# Patient Record
Sex: FEMALE | Race: WHITE | NOT HISPANIC OR LATINO | URBAN - METROPOLITAN AREA
[De-identification: names, ages, dates, MRNs, and addresses within clinical notes are randomized per-mention and may not be internally consistent; named-entity substitution may affect disease eponyms.]

---

## 2017-01-31 ENCOUNTER — OUTPATIENT (OUTPATIENT)
Dept: OUTPATIENT SERVICES | Facility: HOSPITAL | Age: 52
LOS: 1 days | End: 2017-01-31
Payer: COMMERCIAL

## 2017-01-31 ENCOUNTER — APPOINTMENT (OUTPATIENT)
Dept: MRI IMAGING | Facility: CLINIC | Age: 52
End: 2017-01-31

## 2017-01-31 DIAGNOSIS — Z00.8 ENCOUNTER FOR OTHER GENERAL EXAMINATION: ICD-10-CM

## 2017-01-31 PROCEDURE — 73721 MRI JNT OF LWR EXTRE W/O DYE: CPT

## 2017-05-04 ENCOUNTER — OUTPATIENT (OUTPATIENT)
Dept: OUTPATIENT SERVICES | Facility: HOSPITAL | Age: 52
LOS: 1 days | End: 2017-05-04
Payer: COMMERCIAL

## 2017-05-04 ENCOUNTER — APPOINTMENT (OUTPATIENT)
Dept: MAMMOGRAPHY | Facility: CLINIC | Age: 52
End: 2017-05-04

## 2017-05-04 DIAGNOSIS — Z00.00 ENCOUNTER FOR GENERAL ADULT MEDICAL EXAMINATION WITHOUT ABNORMAL FINDINGS: ICD-10-CM

## 2017-05-04 PROCEDURE — 77080 DXA BONE DENSITY AXIAL: CPT

## 2017-05-04 PROCEDURE — 77067 SCR MAMMO BI INCL CAD: CPT

## 2017-05-04 PROCEDURE — 77063 BREAST TOMOSYNTHESIS BI: CPT

## 2017-05-10 DIAGNOSIS — Z78.0 ASYMPTOMATIC MENOPAUSAL STATE: ICD-10-CM

## 2017-05-10 DIAGNOSIS — M85.852 OTHER SPECIFIED DISORDERS OF BONE DENSITY AND STRUCTURE, LEFT THIGH: ICD-10-CM

## 2017-05-10 DIAGNOSIS — Z12.31 ENCOUNTER FOR SCREENING MAMMOGRAM FOR MALIGNANT NEOPLASM OF BREAST: ICD-10-CM

## 2018-07-12 ENCOUNTER — OUTPATIENT (OUTPATIENT)
Dept: OUTPATIENT SERVICES | Facility: HOSPITAL | Age: 53
LOS: 1 days | End: 2018-07-12
Payer: COMMERCIAL

## 2018-07-12 ENCOUNTER — APPOINTMENT (OUTPATIENT)
Dept: MAMMOGRAPHY | Facility: CLINIC | Age: 53
End: 2018-07-12
Payer: COMMERCIAL

## 2018-07-12 DIAGNOSIS — Z12.31 ENCOUNTER FOR SCREENING MAMMOGRAM FOR MALIGNANT NEOPLASM OF BREAST: ICD-10-CM

## 2018-07-12 PROCEDURE — 77067 SCR MAMMO BI INCL CAD: CPT

## 2018-07-12 PROCEDURE — 77067 SCR MAMMO BI INCL CAD: CPT | Mod: 26

## 2018-07-12 PROCEDURE — 77063 BREAST TOMOSYNTHESIS BI: CPT | Mod: 26

## 2018-07-12 PROCEDURE — 77063 BREAST TOMOSYNTHESIS BI: CPT

## 2018-08-11 ENCOUNTER — TRANSCRIPTION ENCOUNTER (OUTPATIENT)
Age: 53
End: 2018-08-11

## 2018-10-22 ENCOUNTER — APPOINTMENT (OUTPATIENT)
Dept: ENDOCRINOLOGY | Facility: CLINIC | Age: 53
End: 2018-10-22
Payer: COMMERCIAL

## 2018-10-22 VITALS — BODY MASS INDEX: 25.61 KG/M2 | WEIGHT: 150 LBS | HEIGHT: 64 IN

## 2018-10-22 VITALS — HEART RATE: 75 BPM | SYSTOLIC BLOOD PRESSURE: 100 MMHG | DIASTOLIC BLOOD PRESSURE: 60 MMHG

## 2018-10-22 DIAGNOSIS — Z78.9 OTHER SPECIFIED HEALTH STATUS: ICD-10-CM

## 2018-10-22 LAB — HBA1C MFR BLD HPLC: 7.4

## 2018-10-22 PROCEDURE — 99244 OFF/OP CNSLTJ NEW/EST MOD 40: CPT

## 2018-10-22 RX ORDER — ACETAMINOPHEN/DIPHENHYDRAMINE 500MG-25MG
1000 TABLET ORAL
Refills: 0 | Status: ACTIVE | COMMUNITY

## 2018-11-01 ENCOUNTER — RESULT REVIEW (OUTPATIENT)
Age: 53
End: 2018-11-01

## 2018-11-07 ENCOUNTER — APPOINTMENT (OUTPATIENT)
Dept: ENDOCRINOLOGY | Facility: CLINIC | Age: 53
End: 2018-11-07

## 2018-11-26 ENCOUNTER — RX RENEWAL (OUTPATIENT)
Age: 53
End: 2018-11-26

## 2018-12-10 ENCOUNTER — APPOINTMENT (OUTPATIENT)
Dept: DERMATOLOGY | Facility: CLINIC | Age: 53
End: 2018-12-10

## 2019-02-14 ENCOUNTER — RX CHANGE (OUTPATIENT)
Age: 54
End: 2019-02-14

## 2019-02-14 ENCOUNTER — APPOINTMENT (OUTPATIENT)
Dept: ENDOCRINOLOGY | Facility: CLINIC | Age: 54
End: 2019-02-14

## 2019-02-14 RX ORDER — LEVOTHYROXINE SODIUM 137 UG/1
137 TABLET ORAL
Refills: 0 | Status: DISCONTINUED | COMMUNITY
End: 2019-02-14

## 2019-04-10 ENCOUNTER — RX RENEWAL (OUTPATIENT)
Age: 54
End: 2019-04-10

## 2019-04-10 ENCOUNTER — MEDICATION RENEWAL (OUTPATIENT)
Age: 54
End: 2019-04-10

## 2019-04-11 ENCOUNTER — MEDICATION RENEWAL (OUTPATIENT)
Age: 54
End: 2019-04-11

## 2019-04-12 ENCOUNTER — MEDICATION RENEWAL (OUTPATIENT)
Age: 54
End: 2019-04-12

## 2019-05-08 ENCOUNTER — RESULT CHARGE (OUTPATIENT)
Age: 54
End: 2019-05-08

## 2019-05-08 ENCOUNTER — APPOINTMENT (OUTPATIENT)
Dept: ENDOCRINOLOGY | Facility: CLINIC | Age: 54
End: 2019-05-08
Payer: COMMERCIAL

## 2019-05-08 VITALS
SYSTOLIC BLOOD PRESSURE: 110 MMHG | BODY MASS INDEX: 26.29 KG/M2 | WEIGHT: 154 LBS | HEIGHT: 64 IN | HEART RATE: 78 BPM | DIASTOLIC BLOOD PRESSURE: 70 MMHG

## 2019-05-08 LAB — GLUCOSE BLDC GLUCOMTR-MCNC: 91

## 2019-05-08 PROCEDURE — 82962 GLUCOSE BLOOD TEST: CPT

## 2019-05-08 PROCEDURE — 99214 OFFICE O/P EST MOD 30 MIN: CPT | Mod: 25

## 2019-05-08 RX ORDER — ERGOCALCIFEROL 1.25 MG/1
1.25 MG CAPSULE ORAL
Refills: 0 | Status: DISCONTINUED | COMMUNITY
End: 2019-05-08

## 2019-05-08 NOTE — HISTORY OF PRESENT ILLNESS
[FreeTextEntry1] : Patient presents today for routine follow-up of diabetes, hypothyroidism, vitamin D deficiency, and hyperlipidemia. Now with adhesive capsulitis of the right shoulder. Getting PT. Had cortisone injections in February.\par \par Type: 1\par Severity: moderate\par Duration: since age 27\par Associated symptoms: none\par Modifying factors: worse due to diet\par \par Current meds for glycemic control:\par Medtronic with Humalog\par SMBG 6x daily, before and after each meal. Reviewed pump download. She is having fasting hyperglycemia and some post prandial hyperglycemia at dinner. She often enters "fake carbs" into the pump to get an insulin bolus. Seems to be inaccurately estimating carbohydrate intake (excessive). One episode of hypoglycemia after taking two boluses back to back for 100 gramd of carbs when she was not eating that much.\par Current B, ate a meal bar 4.5 hours ago\par \par Last eye exam: one year ago, no DR per patient. Denies vision changes.\par Last foot exam: over one year. Denies pain/numbness/tingling in B/L feet\par Diet: reports overeating lately, "eating the bad things ever since the holidays." Very stressed at work, finds it difficult to make a good meal, usually just trying to "get something in."\par Weight: 15 pounds in the past 5-6 months\par Exercise: none\par \par Also with Hashimoto's hypothyroid\par Current regimen: LT4 75 mcg, 1.5 tabs daily.\par Adherent with dosing and administration.\par \par Pump settings\par Basal\par 12a      0.775\par 7a        0.775\par 11a       0.775\par 4p         0.800\par 4:30p    0.775\par 10:30p  0.775\par \par ICR\par 12a      13\par \par ISF\par 12a     50\par \par Target\par 12a     120-140\par 6a       \par \par Active insulin time 4.0 hours

## 2019-05-08 NOTE — REASON FOR VISIT
[FreeTextEntry1] : Dr Coronel  [Initial Evaluation] : an initial evaluation [Follow-Up: _____] : a [unfilled] follow-up visit

## 2019-05-08 NOTE — PHYSICAL EXAM
[No Acute Distress] : no acute distress [Well Nourished] : well nourished [Alert] : alert [Normal Sclera/Conjunctiva] : normal sclera/conjunctiva [Well Developed] : well developed [EOMI] : extra ocular movement intact [Thyroid Not Enlarged] : the thyroid was not enlarged [No Proptosis] : no proptosis [Normal Oropharynx] : the oropharynx was normal [No Thyroid Nodules] : there were no palpable thyroid nodules [No Accessory Muscle Use] : no accessory muscle use [No Respiratory Distress] : no respiratory distress [Normal Rate] : heart rate was normal  [Clear to Auscultation] : lungs were clear to auscultation bilaterally [Normal S1, S2] : normal S1 and S2 [Regular Rhythm] : with a regular rhythm [Pedal Pulses Normal] : the pedal pulses are present [No Edema] : there was no peripheral edema [No Spinal Tenderness] : no spinal tenderness [Oriented x3] : oriented to person, place, and time [No Tremors] : no tremors [Right Foot Was Examined] : right foot ~C was examined [Left Foot Was Examined] : left foot ~C was examined [Normal] : normal [2+] : 2+ in the dorsalis pedis [Normal Affect] : the affect was normal [Normal Mood] : the mood was normal [Acanthosis Nigricans] : no acanthosis nigricans [#1 Diminished] : number 1 was normal [#3 Diminished] : number 3 was normal [#2 Diminished] : number 2 was normal [#4 Diminished] : number 4 was normal [#6 Diminished] : number 6 was normal [#5 Diminished] : number 5 was normal [#7 Diminished] : number 7 was normal [#8 Diminished] : number 8 was normal [#9 Diminished] : number 9 was normal [#10 Diminished] : number 10 was normal

## 2019-06-05 ENCOUNTER — APPOINTMENT (OUTPATIENT)
Dept: ENDOCRINOLOGY | Facility: CLINIC | Age: 54
End: 2019-06-05

## 2019-06-11 NOTE — CONSULT LETTER
Chief complaint: Nausea, vomiting and diarrhea  HPI:  6/10/19,   Time: 10:03 PM         Glendy Sheldon is a 10 y.o. male presenting to the ED for nausea, vomiting and diarrhea. The patient began with symptoms approximately 2 days ago. Nothing seems to make better worse. Symptoms have been constant since onset. Patient does have multiple nausea with multiple episodes of emesis. There is no hematemesis or coffee-ground emesis. The patient is also had multiple episodes of loose stool per day. There is no melena or hematochezia. The patient does have a sick contact in his sister at home. The mother has attempted to use ibuprofen and Pepto-Bismol at home with no relief. There is no suspicious food intake. No recent travel. Patient denies any particular abdominal pain but states that he does have a cramping sensation which is rated 9 out of 10 prior to having a bowel movement. Patient denies any fevers, chest pain, dysuria or hematuria. It is noted in triage complaint the patient does have asthma and is short of breath. The mother reports that the patient simply use his inhaler prior to coming to the emergency department. No shortness of breath at the current time. ROS:   Pertinent positives and negatives are stated within HPI, all other systems reviewed and are negative.  --------------------------------------------- PAST HISTORY ---------------------------------------------  Past Medical History:  has a past medical history of Asthma. Past Surgical History:  has no past surgical history on file. Social History:  reports that he has never smoked. He has never used smokeless tobacco.    Family History: family history is not on file. The patients home medications have been reviewed. Allergies: Patient has no known allergies.     -------------------------------------------------- RESULTS -------------------------------------------------  All laboratory and radiology results have been personally reviewed by myself   LABS:  No results found for this visit on 06/10/19. RADIOLOGY:  Interpreted by Radiologist.  No orders to display       ------------------------- NURSING NOTES AND VITALS REVIEWED ---------------------------   The nursing notes within the ED encounter and vital signs as below have been reviewed. Pulse 100   Temp 97.9 °F (36.6 °C)   Resp 20   Wt 84 lb (38.1 kg)   SpO2 99%   Oxygen Saturation Interpretation: Normal      ---------------------------------------------------PHYSICAL EXAM--------------------------------------      Constitutional/General: Alert and oriented x3, well appearing, non toxic in NAD  Head: NC/AT   Ears: Tympanic membranes unremarkable bilaterally  Eyes: PERRL, EOMI  Mouth: Oropharynx clear, handling secretions, no trismus  Neck: Supple, full ROM, no meningeal signs  Pulmonary: Lungs clear to auscultation bilaterally, no wheezes, rales, or rhonchi. Not in respiratory distress  Cardiovascular:  Regular rate and rhythm, no murmurs, gallops, or rubs. 2+ distal pulses  Abdomen: Soft, non tender, non distended,   Extremities: Moves all extremities x 4. Warm and well perfused  Skin: warm and dry without rash  Neurologic: GCS 15,  Psych: Normal Affect      ------------------------------ ED COURSE/MEDICAL DECISION MAKING----------------------  Medications   ondansetron (ZOFRAN-ODT) disintegrating tablet 4 mg (4 mg Oral Given 6/10/19 2028)         Medical Decision Making:    The patient is a 10year-old male presenting to the emergency department with a chief complaint of vomiting, diarrhea. Patient with benign abdominal examination. Patient was sick contact in sister. Zofran and p.o. challenge. Patient will be discharged home follow-up outpatient. Counseling: The emergency provider has spoken with the patient and discussed todays results, in addition to providing specific details for the plan of care and counseling regarding the diagnosis and prognosis. Questions are answered at this time and they are agreeable with the plan.      --------------------------------- IMPRESSION AND DISPOSITION ---------------------------------    IMPRESSION  1. Non-intractable vomiting with nausea, unspecified vomiting type    2.  Diarrhea, unspecified type        DISPOSITION  Disposition: Discharge to home  Patient condition is stable                  Ginette Andrade DO  06/11/19 0100 [Dear  ___] : Dear  [unfilled], [Consult Letter:] : I had the pleasure of evaluating your patient, [unfilled]. [Please see my note below.] : Please see my note below. [Consult Closing:] : Thank you very much for allowing me to participate in the care of this patient.  If you have any questions, please do not hesitate to contact me.

## 2019-06-21 ENCOUNTER — RX RENEWAL (OUTPATIENT)
Age: 54
End: 2019-06-21

## 2019-06-21 RX ORDER — BLOOD SUGAR DIAGNOSTIC
STRIP MISCELLANEOUS
Qty: 600 | Refills: 1 | Status: DISCONTINUED | COMMUNITY
Start: 2019-04-12 | End: 2019-06-21

## 2019-07-23 ENCOUNTER — APPOINTMENT (OUTPATIENT)
Dept: OBGYN | Facility: CLINIC | Age: 54
End: 2019-07-23

## 2019-08-06 ENCOUNTER — APPOINTMENT (OUTPATIENT)
Dept: ENDOCRINOLOGY | Facility: CLINIC | Age: 54
End: 2019-08-06

## 2019-08-27 ENCOUNTER — RX RENEWAL (OUTPATIENT)
Age: 54
End: 2019-08-27

## 2019-09-16 ENCOUNTER — MEDICATION RENEWAL (OUTPATIENT)
Age: 54
End: 2019-09-16

## 2019-09-19 ENCOUNTER — APPOINTMENT (OUTPATIENT)
Dept: OBGYN | Facility: CLINIC | Age: 54
End: 2019-09-19
Payer: COMMERCIAL

## 2019-09-19 ENCOUNTER — RECORD ABSTRACTING (OUTPATIENT)
Age: 54
End: 2019-09-19

## 2019-09-19 VITALS
HEIGHT: 64 IN | BODY MASS INDEX: 26.46 KG/M2 | WEIGHT: 155 LBS | SYSTOLIC BLOOD PRESSURE: 110 MMHG | DIASTOLIC BLOOD PRESSURE: 68 MMHG

## 2019-09-19 DIAGNOSIS — Z01.419 ENCOUNTER FOR GYNECOLOGICAL EXAMINATION (GENERAL) (ROUTINE) W/OUT ABNORMAL FINDINGS: ICD-10-CM

## 2019-09-19 DIAGNOSIS — Z78.0 ASYMPTOMATIC MENOPAUSAL STATE: ICD-10-CM

## 2019-09-19 DIAGNOSIS — Z82.79 FAMILY HISTORY OF OTHER CONGENITAL MALFORMATIONS, DEFORMATIONS AND CHROMOSOMAL ABNORMALITIES: ICD-10-CM

## 2019-09-19 DIAGNOSIS — Z11.51 ENCOUNTER FOR SCREENING FOR HUMAN PAPILLOMAVIRUS (HPV): ICD-10-CM

## 2019-09-19 DIAGNOSIS — Z82.69 FAMILY HISTORY OF OTHER DISEASES OF THE MUSCULOSKELETAL SYSTEM AND CONNECTIVE TISSUE: ICD-10-CM

## 2019-09-19 DIAGNOSIS — Z20.2 CONTACT WITH AND (SUSPECTED) EXPOSURE TO INFECTIONS WITH A PREDOMINANTLY SEXUAL MODE OF TRANSMISSION: ICD-10-CM

## 2019-09-19 DIAGNOSIS — Z86.39 PERSONAL HISTORY OF OTHER ENDOCRINE, NUTRITIONAL AND METABOLIC DISEASE: ICD-10-CM

## 2019-09-19 DIAGNOSIS — Z12.4 ENCOUNTER FOR SCREENING FOR MALIGNANT NEOPLASM OF CERVIX: ICD-10-CM

## 2019-09-19 LAB
CYTOLOGY CVX/VAG DOC THIN PREP: NEGATIVE
CYTOLOGY CVX/VAG DOC THIN PREP: NEGATIVE

## 2019-09-19 PROCEDURE — 99396 PREV VISIT EST AGE 40-64: CPT

## 2019-09-19 NOTE — PHYSICAL EXAM
[Awake] : awake [Alert] : alert [Acute Distress] : no acute distress [Mass] : no breast mass [Nipple Discharge] : no nipple discharge [Axillary LAD] : no axillary lymphadenopathy [Soft] : soft [Tender] : non tender [Oriented x3] : oriented to person, place, and time [Normal] : uterus [No Bleeding] : there was no active vaginal bleeding [Uterine Adnexae] : were not tender and not enlarged [No Tenderness] : no rectal tenderness [Nl Sphincter Tone] : normal sphincter tone

## 2019-09-20 LAB — HPV HIGH+LOW RISK DNA PNL CVX: NOT DETECTED

## 2019-09-25 LAB — CYTOLOGY CVX/VAG DOC THIN PREP: ABNORMAL

## 2019-09-26 ENCOUNTER — APPOINTMENT (OUTPATIENT)
Dept: ANTEPARTUM | Facility: CLINIC | Age: 54
End: 2019-09-26
Payer: COMMERCIAL

## 2019-09-26 PROCEDURE — 76830 TRANSVAGINAL US NON-OB: CPT

## 2019-09-26 PROCEDURE — 76856 US EXAM PELVIC COMPLETE: CPT | Mod: 59

## 2019-09-28 ENCOUNTER — APPOINTMENT (OUTPATIENT)
Dept: MAMMOGRAPHY | Facility: CLINIC | Age: 54
End: 2019-09-28

## 2019-09-28 ENCOUNTER — APPOINTMENT (OUTPATIENT)
Dept: RADIOLOGY | Facility: CLINIC | Age: 54
End: 2019-09-28

## 2019-11-08 ENCOUNTER — RESULT REVIEW (OUTPATIENT)
Age: 54
End: 2019-11-08

## 2019-11-08 DIAGNOSIS — N64.89 OTHER SPECIFIED DISORDERS OF BREAST: ICD-10-CM

## 2019-11-19 ENCOUNTER — APPOINTMENT (OUTPATIENT)
Dept: ENDOCRINOLOGY | Facility: CLINIC | Age: 54
End: 2019-11-19
Payer: COMMERCIAL

## 2019-11-19 PROCEDURE — 99214 OFFICE O/P EST MOD 30 MIN: CPT

## 2019-11-19 NOTE — HISTORY OF PRESENT ILLNESS
[FreeTextEntry1] : Type 1 Dm since 28 yo. treated with insulin humalog in medtronic pump. \par Hashimoto/ hypoT and takes Lt4\par

## 2019-11-19 NOTE — ASSESSMENT
[Carbohydrate Consistent Diet] : carbohydrate consistent diet [Long Term Vascular Complications] : long term vascular complications of diabetes [Importance of Diet and Exercise] : importance of diet and exercise to improve glycemic control, achieve weight loss and improve cardiovascular health [Self Monitoring of Blood Glucose] : self monitoring of blood glucose [Insulin Self-Administration] : insulin self-administration [Levothyroxine] : The patient was instructed to take Levothyroxine on an empty stomach, separate from vitamins, and wait at least 30 minutes before eating [FreeTextEntry1] : Type 1 dm treated with insulin pump now mildly uncontrolled with a1c now 7.8 \par all lab results reviewed and discussed with patient. All questions answered\par pump report reviewed and discussed with patient. No hypoG events. She has slightly erratic hyperG after meals mostly which probably reflects mismatch insulin to carb.\par continue with current setting basal rate\par lowered ins to carb ratio to 1:13 bc she has consistently high Bg after  meals. \par she does count carbs but not accurately. \par CDE for carb counting review \par goldy/ cratio normal. \par eye exam Sept 2019: reportedly no DR \par discussed diet and exercise\par encouraged more exercise walking 30 min 3 x week\par Discussed complications of diabetes at length including MI/CVA/ESRD/dialysis/blindness/amputations/infections\par Importance of blood glucose monitoring discussed. Targets reviewed. Recommended pt try to keep blood glucose level below 130 (110) before meals and below 160 (140) two hours after meals.\par Bgs 5 x day \par \par bp: at target .No meds. \par \par HLD: LDl at target. Continue with statin.\par \par hypoT: TSh over suppressed. decrease Lt4 to 75 mcg qd and and take 1.5 pill Sat-Sundays  \par \par Hashimoto: Hashimoto's: discussed autoimmune etiology and potential effect on thyroid function. \par requires monitoring Tfts at regular intervals.\par \par low B12: she takes supplements\par \par vitamin d defic: start vitamin d 1000 u qd

## 2019-11-19 NOTE — PHYSICAL EXAM
[Alert] : alert [No Acute Distress] : no acute distress [Well Nourished] : well nourished [Well Developed] : well developed [Normal Sclera/Conjunctiva] : normal sclera/conjunctiva [EOMI] : extra ocular movement intact [No Proptosis] : no proptosis [Normal Oropharynx] : the oropharynx was normal [Thyroid Not Enlarged] : the thyroid was not enlarged [No Thyroid Nodules] : there were no palpable thyroid nodules [No Respiratory Distress] : no respiratory distress [Normal Rate] : heart rate was normal  [No Accessory Muscle Use] : no accessory muscle use [Clear to Auscultation] : lungs were clear to auscultation bilaterally [Normal S1, S2] : normal S1 and S2 [Pedal Pulses Normal] : the pedal pulses are present [No Edema] : there was no peripheral edema [Regular Rhythm] : with a regular rhythm [Normal Bowel Sounds] : normal bowel sounds [Not Tender] : non-tender [Not Distended] : not distended [Soft] : abdomen soft [Anterior Cervical Nodes] : anterior cervical nodes [Post Cervical Nodes] : posterior cervical nodes [Normal] : normal and non tender [Normal Gait] : normal gait [No Stigmata of Cushings Syndrome] : no stigmata of cushings syndrome [No Rash] : no rash [Normal Reflexes] : deep tendon reflexes were 2+ and symmetric [Oriented x3] : oriented to person, place, and time [Acanthosis Nigricans] : no acanthosis nigricans

## 2019-12-20 DIAGNOSIS — Z86.19 PERSONAL HISTORY OF OTHER INFECTIOUS AND PARASITIC DISEASES: ICD-10-CM

## 2020-01-27 ENCOUNTER — RX RENEWAL (OUTPATIENT)
Age: 55
End: 2020-01-27

## 2020-02-26 ENCOUNTER — APPOINTMENT (OUTPATIENT)
Dept: ENDOCRINOLOGY | Facility: CLINIC | Age: 55
End: 2020-02-26
Payer: COMMERCIAL

## 2020-02-26 VITALS
BODY MASS INDEX: 28.51 KG/M2 | WEIGHT: 167 LBS | DIASTOLIC BLOOD PRESSURE: 60 MMHG | HEIGHT: 64 IN | SYSTOLIC BLOOD PRESSURE: 100 MMHG

## 2020-02-26 PROCEDURE — 99214 OFFICE O/P EST MOD 30 MIN: CPT

## 2020-02-26 RX ORDER — GLUCAGON 1 MG
1 KIT INJECTION
Qty: 1 | Refills: 1 | Status: ACTIVE | COMMUNITY
Start: 2020-02-26 | End: 1900-01-01

## 2020-02-26 NOTE — HISTORY OF PRESENT ILLNESS
[FreeTextEntry1] : followup for diabetes \par Type 1 Dm since 28 yo. treated with insulin humalog in medtronic pump. \par Hashimoto/ hypoT and takes Lt4\par

## 2020-02-26 NOTE — PHYSICAL EXAM
[Alert] : alert [Well Nourished] : well nourished [No Acute Distress] : no acute distress [Well Developed] : well developed [Normal Sclera/Conjunctiva] : normal sclera/conjunctiva [EOMI] : extra ocular movement intact [No Proptosis] : no proptosis [Normal Oropharynx] : the oropharynx was normal [Thyroid Not Enlarged] : the thyroid was not enlarged [No Accessory Muscle Use] : no accessory muscle use [No Thyroid Nodules] : there were no palpable thyroid nodules [No Respiratory Distress] : no respiratory distress [Clear to Auscultation] : lungs were clear to auscultation bilaterally [Normal Rate] : heart rate was normal  [Regular Rhythm] : with a regular rhythm [Normal S1, S2] : normal S1 and S2 [Pedal Pulses Normal] : the pedal pulses are present [Not Tender] : non-tender [No Edema] : there was no peripheral edema [Soft] : abdomen soft [Normal Bowel Sounds] : normal bowel sounds [Post Cervical Nodes] : posterior cervical nodes [Anterior Cervical Nodes] : anterior cervical nodes [Not Distended] : not distended [No Stigmata of Cushings Syndrome] : no stigmata of cushings syndrome [Normal] : normal and non tender [Normal Gait] : normal gait [No Rash] : no rash [Oriented x3] : oriented to person, place, and time [Acanthosis Nigricans] : no acanthosis nigricans

## 2020-02-26 NOTE — ASSESSMENT
[Carbohydrate Consistent Diet] : carbohydrate consistent diet [Glucagon Use] : glucagon use [Long Term Vascular Complications] : long term vascular complications of diabetes [Importance of Diet and Exercise] : importance of diet and exercise to improve glycemic control, achieve weight loss and improve cardiovascular health [Self Monitoring of Blood Glucose] : self monitoring of blood glucose [Retinopathy Screening] : Patient was referred to ophthalmology for retinopathy screening [FreeTextEntry1] : Type 1 dm treated with insulin pump now mildly uncontrolled with a1c now 7.9 \par all lab results reviewed and discussed with patient. All questions answered\par pump report reviewed and discussed with patient. No hypoG events. She has slightly erratic hyperG after meals mostly which probably reflects mismatch insulin to carb.\par continue with current setting basal rate\par continue carb ratio to 1:13 bc.It seems that she has hyeprG consistently over night. Bgs am 200-300 and will make appt with CDE to see carb counting ability and matching with insulin. \par goldy/ c ratio normal. \par eye exam Sept 2019: reportedly no DR \par discussed diet and exercise\par encouraged more exercise walking 30 min 3 x week\par Discussed complications of diabetes at length including MI/CVA/ESRD/dialysis/blindness/amputations/infections\par Importance of blood glucose monitoring discussed. Targets reviewed. Recommended pt try to keep blood glucose level below 130 (110) before meals and below 160 (140) two hours after meals.\par Bgs 5 x day \par GFr normal.\par up set she is gaining weight. Advised to keep food journal and 1200 jil/day \par \par bp: at target .No meds. \par \par HLD: LDl at target. Continue with statin.\par \par hypoT/ Hashimoto\par pt euthyroid clinically and biochemically.\par Take daily in AM on an empty stomach at least 30 minutes before eating or taking other medication.\par continue LT5 75 mcg qd   \par \par \par low B12: she takes supplements\par \par vitamin d defic: start vitamin d 1000 u qd

## 2020-03-04 ENCOUNTER — APPOINTMENT (OUTPATIENT)
Dept: ENDOCRINOLOGY | Facility: CLINIC | Age: 55
End: 2020-03-04
Payer: COMMERCIAL

## 2020-03-04 PROCEDURE — G0108 DIAB MANAGE TRN  PER INDIV: CPT

## 2020-03-18 ENCOUNTER — APPOINTMENT (OUTPATIENT)
Dept: ENDOCRINOLOGY | Facility: CLINIC | Age: 55
End: 2020-03-18

## 2020-03-18 ENCOUNTER — RX RENEWAL (OUTPATIENT)
Age: 55
End: 2020-03-18

## 2020-05-27 ENCOUNTER — APPOINTMENT (OUTPATIENT)
Dept: ENDOCRINOLOGY | Facility: CLINIC | Age: 55
End: 2020-05-27

## 2020-08-07 ENCOUNTER — RX RENEWAL (OUTPATIENT)
Age: 55
End: 2020-08-07

## 2020-09-22 NOTE — REVIEW OF SYSTEMS
none [Recent Weight Gain (___ Lbs)] : recent [unfilled] ~Ulb weight gain [Constipation] : constipation [Stress] : stress [Dysphagia] : no dysphagia [Dysphonia] : no dysphonia [Neck Pain] : no neck pain [Palpitations] : no palpitations [Chest Pain] : no chest pain [Shortness Of Breath] : no shortness of breath [Nausea] : no nausea [Vomiting] : no vomiting was observed [Diarrhea] : no diarrhea [Abdominal Pain] : no abdominal pain [Polyuria] : no polyuria [Tremors] : no tremors [Pain/Numbness of Digits] : no pain/numbness of digits [Depression] : no depression [Polydipsia] : no polydipsia [Anxiety] : no anxiety [Cold Intolerance] : cold tolerant [Heat Intolerance] : heat tolerant [FreeTextEntry3] : no vision changes

## 2020-10-29 ENCOUNTER — RX RENEWAL (OUTPATIENT)
Age: 55
End: 2020-10-29

## 2020-11-02 ENCOUNTER — RX RENEWAL (OUTPATIENT)
Age: 55
End: 2020-11-02

## 2020-12-05 ENCOUNTER — LABORATORY RESULT (OUTPATIENT)
Age: 55
End: 2020-12-05

## 2020-12-09 ENCOUNTER — APPOINTMENT (OUTPATIENT)
Dept: ENDOCRINOLOGY | Facility: CLINIC | Age: 55
End: 2020-12-09
Payer: COMMERCIAL

## 2020-12-09 VITALS
DIASTOLIC BLOOD PRESSURE: 64 MMHG | SYSTOLIC BLOOD PRESSURE: 110 MMHG | HEART RATE: 73 BPM | BODY MASS INDEX: 29.53 KG/M2 | HEIGHT: 64 IN | WEIGHT: 173 LBS

## 2020-12-09 PROCEDURE — 99072 ADDL SUPL MATRL&STAF TM PHE: CPT

## 2020-12-09 PROCEDURE — 99214 OFFICE O/P EST MOD 30 MIN: CPT

## 2020-12-09 NOTE — HISTORY OF PRESENT ILLNESS
[FreeTextEntry1] : Pt works from home.\par C/O weight gain but she lives a relatively sedentary life\par \par T1DM\par Severity: well controlled \par Onset: was misdiagnosed as T2DM\par Duration: approx 28 years\par \par SMBG\par Finger sticks: Checks 7x a day \par On average 100s , rare 200s , rare less than 100s \par Did not like a sensor, she felt it wasn’t accurate\par \par HGA1C 7.3- improved\par \par Current drug regimen\par Medtronic pump- Humalog in pump\par \par Eye exam: last visit 2019, denies DR\par Foot exam: does not see podiatry- denies numbness/tingling in b.l feet \par Kidney disease: on ramipril for kidney protection \par Heart disease: denies but strong family history \par \par Weight: approx 6 lb weight gain since last visit\par Diet:\par B: does not eat breakfast, tea with milk/equal \par L: nutrasystem meals , sandwhich/egg salad sandwhich on whole wheat \par D: meat, veggie, starch\par S: peanut butter \par Exercise: does not exercise\par Smoking: former smoker \par \par Hashimotos\par Current regimen: Levothyroxine 75 mcg daily\par Patient advised to take every day in the morning, on an empty stomach, and with no other medications. \par Current TFTs: TSH 3.53, Free T4 1.4 \par \par HLD\par Triglyceride 41, Total cholesterol 174, HDL 75, LDL 90\par Atorvastatin 20 mg daily\par \par Vit B Def\par On daily B12 1000 mcg daily\par No updated levels\par \par Vit D Def\par On labs 36.3\par On 2000 IU daily

## 2020-12-09 NOTE — PHYSICAL EXAM
[Alert] : alert [Well Nourished] : well nourished [No Acute Distress] : no acute distress [Normal Sclera/Conjunctiva] : normal sclera/conjunctiva [Normal Hearing] : hearing was normal [Thyroid Not Enlarged] : the thyroid was not enlarged [No Accessory Muscle Use] : no accessory muscle use [Clear to Auscultation] : lungs were clear to auscultation bilaterally [Normal S1, S2] : normal S1 and S2 [Normal Rate] : heart rate was normal [No Edema] : no peripheral edema [Not Tender] : non-tender [Soft] : abdomen soft [Normal Gait] : normal gait [No Rash] : no rash [No Tremors] : no tremors [Oriented x3] : oriented to person, place, and time [de-identified] : dry skin

## 2020-12-09 NOTE — REVIEW OF SYSTEMS
[Recent Weight Gain (___ Lbs)] : recent weight gain: [unfilled] lbs [Fatigue] : no fatigue [Recent Weight Loss (___ Lbs)] : no recent weight loss [Visual Field Defect] : no visual field defect [Blurred Vision] : no blurred vision [Dysphagia] : no dysphagia [Neck Pain] : no neck pain [Dysphonia] : no dysphonia [Chest Pain] : no chest pain [Shortness Of Breath] : no shortness of breath [Nausea] : no nausea [Constipation] : no constipation [Vomiting] : no vomiting [Diarrhea] : no diarrhea [Polyuria] : no polyuria [Headaches] : no headaches [Polydipsia] : no polydipsia

## 2020-12-09 NOTE — ASSESSMENT
[FreeTextEntry1] : T1DM\par -Discussed with patient that there is new technology out for pumps and sensors and since its been a while since she explored different options, she should learn more. She will make CDE apt\par -For now, only change will be patient will bolus for her tea that she drinks 2x a day considering its caffiene and she uses milk\par -Continue to watch diet, especially through the holidays\par -Increase exercise as tolerated, goal of 30 mins a day 5x a week\par -Continue to follow up with all specialists including ophtho, podiatry\par \par \par Hypothyroid\par -Continue LT4 75 mcg but increase to 8 tablets a week for goal TSH closer to 2\par \par \par HLD\par -Continue statin, lipid panel meets goal \par \par Vit B Def\par -Continue current dose of supplement, will check levels with next labs\par \par Vit D Def\par -Continue current dose of daily supplement, levels WNL\par \par \par RTO 3 months, labs before next visit

## 2020-12-21 PROBLEM — Z01.419 ENCOUNTER FOR GYNECOLOGICAL EXAMINATION: Status: RESOLVED | Noted: 2019-09-19 | Resolved: 2020-12-21

## 2020-12-21 PROBLEM — Z86.19 HISTORY OF CANDIDIASIS OF VAGINA: Status: RESOLVED | Noted: 2019-12-20 | Resolved: 2020-12-21

## 2021-01-06 ENCOUNTER — APPOINTMENT (OUTPATIENT)
Dept: ENDOCRINOLOGY | Facility: CLINIC | Age: 56
End: 2021-01-06

## 2021-03-18 ENCOUNTER — LABORATORY RESULT (OUTPATIENT)
Age: 56
End: 2021-03-18

## 2021-03-22 ENCOUNTER — APPOINTMENT (OUTPATIENT)
Dept: ENDOCRINOLOGY | Facility: CLINIC | Age: 56
End: 2021-03-22
Payer: COMMERCIAL

## 2021-03-22 ENCOUNTER — LABORATORY RESULT (OUTPATIENT)
Age: 56
End: 2021-03-22

## 2021-03-22 VITALS
SYSTOLIC BLOOD PRESSURE: 110 MMHG | BODY MASS INDEX: 30.05 KG/M2 | TEMPERATURE: 96.3 F | DIASTOLIC BLOOD PRESSURE: 68 MMHG | WEIGHT: 176 LBS | HEIGHT: 64 IN

## 2021-03-22 PROCEDURE — 99072 ADDL SUPL MATRL&STAF TM PHE: CPT

## 2021-03-22 PROCEDURE — 99214 OFFICE O/P EST MOD 30 MIN: CPT

## 2021-03-22 RX ORDER — AMOXICILLIN AND CLAVULANATE POTASSIUM 500; 125 MG/1; MG/1
500-125 TABLET, FILM COATED ORAL
Qty: 21 | Refills: 0 | Status: DISCONTINUED | COMMUNITY
Start: 2021-01-09

## 2021-03-22 NOTE — HISTORY OF PRESENT ILLNESS
[FreeTextEntry1] : Pt works from home.\par C/O weight gain but she lives a relatively sedentary life\par Pt is fully vaccinated\par \par T1DM\par Severity: well controlled \par Onset: was misdiagnosed as T2DM\par Duration: approx 28 years\par \par SMBG\par Finger sticks: Checks 7x a day \par On average 100s , rare 200s , rare less than 100s \par Did not like a sensor, she felt it wasn’t accurate\par \par HGA1C 7.1- improved\par \par Current drug regimen\par Medtronic pump- Humalog in pump\par \par Eye exam: last visit 2019, denies DR\par Foot exam: does not see podiatry- denies numbness/tingling in b.l feet \par Kidney disease: on ramipril for kidney protection \par Heart disease: denies but strong family history \par \par Weight: approx 6 lb weight gain since last visit\par Diet:\par B: does not eat breakfast, tea with milk/equal \par L: nutrasystem meals , sandwhich/egg salad sandwhich on whole wheat \par D: meat, veggie, starch\par S: peanut butter \par Exercise: does not exercise\par Smoking: former smoker \par \par Hashimotos\par Current regimen: Levothyroxine 75 mcg daily\par Patient advised to take every day in the morning, on an empty stomach, and with no other medications. \par Current TFTs: TSH 5.16, Free T4 1.6 , Free T3 2.02\par \par HLD\par Triglyceride 134, Total cholesterol 179, HDL 63, LDL 89\par Atorvastatin 20 mg day\par \par Vit B Def\par On daily B12 1000 mcg daily\par On labs 1974\par \par Vit D Def\par On labs 39.1\par On 2000 IU daily\par \par +fatty liver

## 2021-03-22 NOTE — ASSESSMENT
[FreeTextEntry1] : T1DM\par -Discussed with patient that there is new technology out for pumps and sensors and since its been a while since she explored different options, she should learn more. She will make CDE apt to discuss sensors \par -No changes to pump settings\par -Continue to watch diet, especially through the holidays\par -Increase exercise as tolerated, goal of 30 mins a day 5x a week\par -Continue to follow up with all specialists including ophtho, podiatry\par \par \par Hypothyroid\par -Increase LT4 88 mcg daily, Patient advised to take every day in the morning, on an empty stomach, and with no other medications. \par -RX given for thyroid ultrasound\par \par HLD\par -Continue statin, lipid panel meets goal \par \par Vit B Def\par -Decrease supplement to every other day, levels elevated\par \par Vit D Def\par -Continue current dose of daily supplement, levels WNL\par \par Repeat CMP done in office today to monitor new onset fatty liver\par \par RTO 3 months, labs before next visit

## 2021-03-22 NOTE — PHYSICAL EXAM
[Alert] : alert [Well Nourished] : well nourished [No Acute Distress] : no acute distress [Normal Sclera/Conjunctiva] : normal sclera/conjunctiva [Normal Hearing] : hearing was normal [Thyroid Not Enlarged] : the thyroid was not enlarged [No Accessory Muscle Use] : no accessory muscle use [Clear to Auscultation] : lungs were clear to auscultation bilaterally [Normal S1, S2] : normal S1 and S2 [Normal Rate] : heart rate was normal [No Edema] : no peripheral edema [Not Tender] : non-tender [Soft] : abdomen soft [Normal Gait] : normal gait [No Rash] : no rash [No Tremors] : no tremors [Oriented x3] : oriented to person, place, and time [de-identified] : dry skin

## 2021-03-22 NOTE — REVIEW OF SYSTEMS
[Recent Weight Gain (___ Lbs)] : recent weight gain: [unfilled] lbs [Constipation] : constipation [Fatigue] : no fatigue [Recent Weight Loss (___ Lbs)] : no recent weight loss [Visual Field Defect] : no visual field defect [Blurred Vision] : no blurred vision [Dysphagia] : no dysphagia [Neck Pain] : no neck pain [Dysphonia] : no dysphonia [Chest Pain] : no chest pain [Shortness Of Breath] : no shortness of breath [Diarrhea] : no diarrhea [Polyuria] : no polyuria [Polydipsia] : no polydipsia

## 2021-03-23 LAB
COVID-19 NUCLEOCAPSID  GAM ANTIBODY INTERPRETATION: NEGATIVE
COVID-19 SPIKE DOMAIN ANTIBODY INTERPRETATION: POSITIVE
SARS-COV-2 AB SERPL IA-ACNC: >250 U/ML
SARS-COV-2 AB SERPL QL IA: 0.07 INDEX

## 2021-04-02 ENCOUNTER — OUTPATIENT (OUTPATIENT)
Dept: OUTPATIENT SERVICES | Facility: HOSPITAL | Age: 56
LOS: 1 days | End: 2021-04-02
Payer: COMMERCIAL

## 2021-04-02 ENCOUNTER — APPOINTMENT (OUTPATIENT)
Dept: CT IMAGING | Facility: CLINIC | Age: 56
End: 2021-04-02

## 2021-04-02 DIAGNOSIS — Z00.8 ENCOUNTER FOR OTHER GENERAL EXAMINATION: ICD-10-CM

## 2021-04-02 PROCEDURE — 74176 CT ABD & PELVIS W/O CONTRAST: CPT

## 2021-04-02 PROCEDURE — 74176 CT ABD & PELVIS W/O CONTRAST: CPT | Mod: 26

## 2021-04-15 ENCOUNTER — APPOINTMENT (OUTPATIENT)
Dept: ENDOCRINOLOGY | Facility: CLINIC | Age: 56
End: 2021-04-15
Payer: COMMERCIAL

## 2021-04-15 PROCEDURE — 99072 ADDL SUPL MATRL&STAF TM PHE: CPT

## 2021-04-15 PROCEDURE — G0108 DIAB MANAGE TRN  PER INDIV: CPT

## 2021-04-28 ENCOUNTER — TRANSCRIPTION ENCOUNTER (OUTPATIENT)
Age: 56
End: 2021-04-28

## 2021-06-25 ENCOUNTER — LABORATORY RESULT (OUTPATIENT)
Age: 56
End: 2021-06-25

## 2021-06-29 ENCOUNTER — APPOINTMENT (OUTPATIENT)
Dept: ENDOCRINOLOGY | Facility: CLINIC | Age: 56
End: 2021-06-29
Payer: COMMERCIAL

## 2021-06-29 ENCOUNTER — RESULT CHARGE (OUTPATIENT)
Age: 56
End: 2021-06-29

## 2021-06-29 VITALS
WEIGHT: 168 LBS | HEIGHT: 64 IN | DIASTOLIC BLOOD PRESSURE: 62 MMHG | OXYGEN SATURATION: 98 % | HEART RATE: 76 BPM | SYSTOLIC BLOOD PRESSURE: 118 MMHG | BODY MASS INDEX: 28.68 KG/M2

## 2021-06-29 LAB — GLUCOSE BLDC GLUCOMTR-MCNC: 129

## 2021-06-29 PROCEDURE — 99072 ADDL SUPL MATRL&STAF TM PHE: CPT

## 2021-06-29 PROCEDURE — 99214 OFFICE O/P EST MOD 30 MIN: CPT | Mod: 25

## 2021-06-29 PROCEDURE — 82962 GLUCOSE BLOOD TEST: CPT

## 2021-06-29 RX ORDER — BLOOD SUGAR DIAGNOSTIC
STRIP MISCELLANEOUS
Qty: 7 | Refills: 0 | Status: DISCONTINUED | COMMUNITY
Start: 2020-04-02 | End: 2021-06-29

## 2021-06-29 NOTE — HISTORY OF PRESENT ILLNESS
[FreeTextEntry1] : Pt doing well, best she has ever felt. Now on optavia diet.\par \par T1DM\par Severity: well controlled \par Onset: was misdiagnosed as T2DM\par Duration: approx 28 years\par \par SMBG\par Finger sticks: Checks 7x a day \par On average 100s\par \par HGA1C 6.4- improved\par \par Current drug regimen\par Medtronic pump- Humalog in pump\par \par Eye exam: 5/2021, denies DR\par Foot exam: does not see podiatry- denies numbness/tingling in b.l feet \par Kidney disease: on ramipril for kidney protection \par Heart disease: denies but strong family history \par \par Weight: lost 9 lbs since being on optavia\par Diet:\par B: does not eat breakfast, tea with milk/equal \par L: nutrasystem meals , sandwhich/egg salad sandwhich on whole wheat \par D: meat, veggie, starch\par S: peanut butter \par Exercise: does not exercise during fat burn stage of optavia \par Smoking: former smoker \par \par Hashimotos\par Current regimen: Levothyroxine 88 mcg daily\par Patient advised to take every day in the morning, on an empty stomach, and with no other medications. \par Current TFTs: TSH 4.90, Free T4 1.4 , Free T3 2.14\par \par HLD\par Triglyceride 77, Total cholesterol 126, HDL 57, LDL 54\par Atorvastatin 20 mg day\par \par Vit B Def\par On daily B12 1000 mcg daily\par On labs 1774\par \par Vit D Def\par On labs 39.1\par On 2000 IU daily\par

## 2021-06-29 NOTE — REVIEW OF SYSTEMS
[Recent Weight Loss (___ Lbs)] : recent weight loss: [unfilled] lbs [Constipation] : constipation [Fatigue] : no fatigue [Visual Field Defect] : no visual field defect [Blurred Vision] : no blurred vision [Chest Pain] : no chest pain [Shortness Of Breath] : no shortness of breath [Diarrhea] : no diarrhea [Polyuria] : no polyuria [Polydipsia] : no polydipsia

## 2021-06-29 NOTE — ASSESSMENT
[FreeTextEntry1] : T1DM\par -Pt doing very well on Optavia diet, now having some low blood sugars. Needs less basal several times during day, doses changed on pump. \par -Pt would like to upgrade/change to TANDEM.DEXCOM in 12/2021 when contract is up.\par -Continue to watch diet, especially through the holidays\par -Increase exercise as tolerated, goal of 30 mins a day 5x a week\par -Continue to follow up with all specialists including ophtho, podiatry\par \par Pt should repeat DEXA 10/2021 as she fractured her ankle last month and is due for q2 year surviellance\par \par Hypothyroid\par -Increase 100 mcg daily, Patient advised to take every day in the morning, on an empty stomach, and with no other medications. \par \par HLD\par -Continue statin, lipid panel meets goal \par \par Vit B Def\par -Decrease supplement to every other day, levels elevated\par \par Vit D Def\par -Continue current dose of daily supplement, levels WNL\par \par RTO 3 months, labs before next visit

## 2021-06-29 NOTE — PHYSICAL EXAM
[Alert] : alert [Well Nourished] : well nourished [No Acute Distress] : no acute distress [Normal Sclera/Conjunctiva] : normal sclera/conjunctiva [Normal Hearing] : hearing was normal [Thyroid Not Enlarged] : the thyroid was not enlarged [No Accessory Muscle Use] : no accessory muscle use [Clear to Auscultation] : lungs were clear to auscultation bilaterally [Normal S1, S2] : normal S1 and S2 [Normal Rate] : heart rate was normal [No Edema] : no peripheral edema [Not Tender] : non-tender [Soft] : abdomen soft [Normal Gait] : normal gait [No Rash] : no rash [No Tremors] : no tremors [Oriented x3] : oriented to person, place, and time [de-identified] : dry skin

## 2021-08-16 RX ORDER — INSULIN GLARGINE 100 [IU]/ML
100 INJECTION, SOLUTION SUBCUTANEOUS
Qty: 2 | Refills: 0 | Status: DISCONTINUED | COMMUNITY
Start: 2021-08-16 | End: 2021-08-16

## 2021-08-17 ENCOUNTER — EMERGENCY (EMERGENCY)
Facility: HOSPITAL | Age: 56
LOS: 1 days | End: 2021-08-17
Payer: SELF-PAY

## 2021-08-17 PROCEDURE — L9992: CPT

## 2021-08-19 ENCOUNTER — APPOINTMENT (OUTPATIENT)
Dept: ENDOCRINOLOGY | Facility: CLINIC | Age: 56
End: 2021-08-19

## 2021-09-23 ENCOUNTER — APPOINTMENT (OUTPATIENT)
Dept: ENDOCRINOLOGY | Facility: CLINIC | Age: 56
End: 2021-09-23

## 2021-10-20 ENCOUNTER — APPOINTMENT (OUTPATIENT)
Dept: ENDOCRINOLOGY | Facility: CLINIC | Age: 56
End: 2021-10-20
Payer: COMMERCIAL

## 2021-10-20 VITALS — DIASTOLIC BLOOD PRESSURE: 60 MMHG | SYSTOLIC BLOOD PRESSURE: 112 MMHG

## 2021-10-20 VITALS — HEART RATE: 72 BPM | OXYGEN SATURATION: 97 %

## 2021-10-20 VITALS — WEIGHT: 154 LBS | BODY MASS INDEX: 26.29 KG/M2 | HEIGHT: 64 IN

## 2021-10-20 LAB — GLUCOSE BLDC GLUCOMTR-MCNC: 118

## 2021-10-20 PROCEDURE — 82962 GLUCOSE BLOOD TEST: CPT

## 2021-10-20 PROCEDURE — 99214 OFFICE O/P EST MOD 30 MIN: CPT | Mod: 25

## 2021-10-20 RX ORDER — LANCETS
EACH MISCELLANEOUS
Qty: 8 | Refills: 1 | Status: DISCONTINUED | COMMUNITY
Start: 2021-08-17 | End: 2021-10-20

## 2021-10-20 RX ORDER — INSULIN LISPRO 100 [IU]/ML
100 INJECTION, SOLUTION INTRAVENOUS; SUBCUTANEOUS
Qty: 1 | Refills: 0 | Status: DISCONTINUED | COMMUNITY
Start: 2021-08-16 | End: 2021-10-20

## 2021-10-20 RX ORDER — URINE ACETONE TEST STRIPS
STRIP MISCELLANEOUS
Qty: 1 | Refills: 0 | Status: DISCONTINUED | COMMUNITY
Start: 2021-08-16 | End: 2021-10-20

## 2021-10-20 RX ORDER — INSULIN GLARGINE 100 [IU]/ML
100 INJECTION, SOLUTION SUBCUTANEOUS
Qty: 1 | Refills: 0 | Status: DISCONTINUED | COMMUNITY
Start: 2021-08-16 | End: 2021-10-20

## 2021-10-20 RX ORDER — INSULIN PUMP SYRINGE, 3 ML
EACH MISCELLANEOUS
Qty: 3 | Refills: 0 | Status: DISCONTINUED | COMMUNITY
End: 2021-10-20

## 2021-10-20 RX ORDER — PEN NEEDLE, DIABETIC 29 G X1/2"
32G X 4 MM NEEDLE, DISPOSABLE MISCELLANEOUS
Qty: 1 | Refills: 0 | Status: DISCONTINUED | COMMUNITY
Start: 2021-08-16 | End: 2021-10-20

## 2021-10-20 RX ORDER — INSULIN PUMP SYRINGE, 3 ML
EACH MISCELLANEOUS
Qty: 5 | Refills: 0 | Status: DISCONTINUED | OUTPATIENT
Start: 2020-02-26 | End: 2021-10-20

## 2021-10-20 NOTE — HISTORY OF PRESENT ILLNESS
[FreeTextEntry1] : T1DM\par Severity: well controlled \par Onset: was misdiagnosed as T2DM\par Duration: approx 28 years\par \par SMBG\par Finger sticks: Checks 7x a day \par On average in AM - 80-100s\par On Average in afternoons- 180-200\par Denies hypoglycemic events\par \par HGA1C 6.7\par \par Current drug regimen\par Medtronic pump- Humalog in pump\par \par -Pump Settings: \par 00:00 - 0700- 0.825\par 07:00- 1100 - 0.8\par [id number] 0.725\par 0106-0267 - 0.8\par 1630- 2230 - .725\par 2230 - 0000 - 0.775\par \par Eye exam: 5/2021, denies DR\par Foot exam: does not see podiatry- denies numbness/tingling in b.l feet \par Kidney disease: on ramipril for kidney protection \par Heart disease: denies but strong family history \par \par Weight: lost 23 lbs since being on optavia\par Diet:\par B: does not eat breakfast, tea with milk/equal \par L: optivia meals , sandwhich/egg salad sandwhich on whole wheat \par D: meat, veggie, starch\par S: peanut butter \par Exercise: none\par Smoking: former smoker \par \par Hashimoto's\par Current regimen: Levothyroxine 100 mcg daily\par Patient advised to take every day in the morning, on an empty stomach, and with no other medications. \par Current TFTs: TSH 6.51, Free T4 1.54 , Free T3 2.3\par \par HLD\par Triglyceride 65, Total cholesterol 153, HDL 61, LDL 79\par Atorvastatin 20 mg day\par \par Vit B Def\par On daily B12 1000 mcg daily\par On labs 1768\par \par Vit D Def\par On labs 75.5\par On 2000 IU daily\par

## 2021-10-20 NOTE — REVIEW OF SYSTEMS
[Fatigue] : fatigue [Visual Field Defect] : no visual field defect [Blurred Vision] : no blurred vision [Dysphagia] : no dysphagia [Neck Pain] : no neck pain [Chest Pain] : no chest pain [Palpitations] : no palpitations [Shortness Of Breath] : no shortness of breath [Nausea] : no nausea [Constipation] : no constipation [Vomiting] : no vomiting [Diarrhea] : no diarrhea [Polyuria] : no polyuria

## 2021-10-20 NOTE — ASSESSMENT
[Diabetes Foot Care] : diabetes foot care [Long Term Vascular Complications] : long term vascular complications of diabetes [Carbohydrate Consistent Diet] : carbohydrate consistent diet [Importance of Diet and Exercise] : importance of diet and exercise to improve glycemic control, achieve weight loss and improve cardiovascular health [Exercise/Effect on Glucose] : exercise/effect on glucose [Retinopathy Screening] : Patient was referred to ophthalmology for retinopathy screening [FreeTextEntry1] : T1DM\par -Pt doing very well on Optavia diet, \par -Now having some high blood sugars in the afternoon. \par -Pt would like to upgrade/change to TANDEM.DEXCOM in 12/2021 when contract is up.\par -Continue to watch diet, especially through the holidays\par -Increase exercise as tolerated, goal of 30 mins a day 5x a week\par -Continue to follow up with all specialists including ophtho, podiatry\par -Pump Settings: Changes\par 00:00 - 0700- 0.825\par 07:00- 15:30 - 0.8\par 1530- 1600 - 0.825\par [id number] - 0.8\par 1630- 2230 - .725\par 2230 - 0000 - 0.775\par \par Pt due to repeat DEXA NOW as she fractured her ankle last month and is due for q2 year surviellance\par \par Hypothyroid\par -Increase 125 mcg daily, Patient advised to take every day in the morning, on an empty stomach, and with no other medications. \par -Repeat tft's on 6 weeks and f/u\par \par HLD\par -Continue statin, lipid panel meets goal \par \par Vit B Def\par -Decrease supplement to every other day, levels elevated\par \par Vit D Def\par -Continue current dose of daily supplement, levels WNL\par \par \par RTO in 6 weeks NP\par RTO in 4 months Dr. Cristobal \par

## 2021-10-20 NOTE — PHYSICAL EXAM
[Alert] : alert [Normal Sclera/Conjunctiva] : normal sclera/conjunctiva [Normal Hearing] : hearing was normal [No Neck Mass] : no neck mass was observed [Thyroid Not Enlarged] : the thyroid was not enlarged [No Respiratory Distress] : no respiratory distress [No Accessory Muscle Use] : no accessory muscle use [Clear to Auscultation] : lungs were clear to auscultation bilaterally [Normal S1, S2] : normal S1 and S2 [Normal Rate] : heart rate was normal [No Stigmata of Cushings Syndrome] : no stigmata of Cushings Syndrome [Normal Gait] : normal gait [Oriented x3] : oriented to person, place, and time

## 2021-11-06 ENCOUNTER — APPOINTMENT (OUTPATIENT)
Dept: RADIOLOGY | Facility: CLINIC | Age: 56
End: 2021-11-06
Payer: COMMERCIAL

## 2021-11-06 ENCOUNTER — APPOINTMENT (OUTPATIENT)
Dept: ULTRASOUND IMAGING | Facility: CLINIC | Age: 56
End: 2021-11-06
Payer: COMMERCIAL

## 2021-11-06 ENCOUNTER — OUTPATIENT (OUTPATIENT)
Dept: OUTPATIENT SERVICES | Facility: HOSPITAL | Age: 56
LOS: 1 days | End: 2021-11-06
Payer: COMMERCIAL

## 2021-11-06 DIAGNOSIS — E06.3 AUTOIMMUNE THYROIDITIS: ICD-10-CM

## 2021-11-06 DIAGNOSIS — Z78.0 ASYMPTOMATIC MENOPAUSAL STATE: ICD-10-CM

## 2021-11-06 PROCEDURE — 77080 DXA BONE DENSITY AXIAL: CPT | Mod: 26

## 2021-11-06 PROCEDURE — 76536 US EXAM OF HEAD AND NECK: CPT

## 2021-11-06 PROCEDURE — 76536 US EXAM OF HEAD AND NECK: CPT | Mod: 26

## 2021-11-06 PROCEDURE — 77080 DXA BONE DENSITY AXIAL: CPT

## 2021-12-21 ENCOUNTER — APPOINTMENT (OUTPATIENT)
Dept: ENDOCRINOLOGY | Facility: CLINIC | Age: 56
End: 2021-12-21

## 2022-01-28 ENCOUNTER — RX RENEWAL (OUTPATIENT)
Age: 57
End: 2022-01-28

## 2022-02-08 ENCOUNTER — NON-APPOINTMENT (OUTPATIENT)
Age: 57
End: 2022-02-08

## 2022-02-10 ENCOUNTER — APPOINTMENT (OUTPATIENT)
Dept: ENDOCRINOLOGY | Facility: CLINIC | Age: 57
End: 2022-02-10
Payer: COMMERCIAL

## 2022-02-10 PROCEDURE — P0006: CPT

## 2022-03-01 ENCOUNTER — APPOINTMENT (OUTPATIENT)
Dept: ENDOCRINOLOGY | Facility: CLINIC | Age: 57
End: 2022-03-01
Payer: COMMERCIAL

## 2022-03-01 VITALS — HEART RATE: 86 BPM | OXYGEN SATURATION: 98 % | DIASTOLIC BLOOD PRESSURE: 62 MMHG | SYSTOLIC BLOOD PRESSURE: 100 MMHG

## 2022-03-01 VITALS — BODY MASS INDEX: 26.49 KG/M2 | HEIGHT: 64 IN | WEIGHT: 155.13 LBS

## 2022-03-01 PROCEDURE — 95251 CONT GLUC MNTR ANALYSIS I&R: CPT

## 2022-03-01 PROCEDURE — 99214 OFFICE O/P EST MOD 30 MIN: CPT | Mod: 25

## 2022-03-01 RX ORDER — INFUSION SET FOR INSULIN PUMP
INFUSION SETS-PARAPHERNALIA MISCELLANEOUS
Qty: 3 | Refills: 0 | Status: DISCONTINUED | COMMUNITY
Start: 1900-01-01 | End: 2022-03-01

## 2022-03-01 RX ORDER — INFUSION SET FOR INSULIN PUMP
INFUSION SETS-PARAPHERNALIA MISCELLANEOUS
Qty: 5 | Refills: 0 | Status: DISCONTINUED | OUTPATIENT
Start: 2020-02-26 | End: 2022-03-01

## 2022-03-01 RX ORDER — BLOOD-GLUCOSE METER
W/DEVICE EACH MISCELLANEOUS
Qty: 1 | Refills: 0 | Status: DISCONTINUED | COMMUNITY
Start: 2019-06-21 | End: 2022-03-01

## 2022-03-01 RX ORDER — BLOOD SUGAR DIAGNOSTIC
STRIP MISCELLANEOUS
Qty: 8 | Refills: 1 | Status: DISCONTINUED | COMMUNITY
Start: 2021-08-17 | End: 2022-03-01

## 2022-03-01 RX ORDER — FLUCONAZOLE 150 MG/1
150 TABLET ORAL
Qty: 2 | Refills: 1 | Status: ACTIVE | COMMUNITY
Start: 2019-12-20 | End: 1900-01-01

## 2022-04-05 ENCOUNTER — RX RENEWAL (OUTPATIENT)
Age: 57
End: 2022-04-05

## 2022-04-11 ENCOUNTER — RX RENEWAL (OUTPATIENT)
Age: 57
End: 2022-04-11

## 2022-04-11 NOTE — REVIEW OF SYSTEMS
[Recent Weight Loss (___ Lbs)] : recent weight loss: [unfilled] lbs [Fatigue] : no fatigue [Recent Weight Gain (___ Lbs)] : no recent weight gain [Visual Field Defect] : no visual field defect [Blurred Vision] : no blurred vision [Dysphagia] : no dysphagia [Neck Pain] : no neck pain [Dysphonia] : no dysphonia [Chest Pain] : no chest pain [Shortness Of Breath] : no shortness of breath [Constipation] : no constipation [Diarrhea] : no diarrhea [Polyuria] : no polyuria [Polydipsia] : no polydipsia [FreeTextEntry5] : has muscular/rib pain after a fall

## 2022-04-11 NOTE — ASSESSMENT
[FreeTextEntry1] : T1DM\par -Pt doing very well on Optavia diet,seems to be spiking slightly after her morning breakfast which is the same everyday- oatmeal. Tighten carb ratio from 7a-11a to 1:10.\par -No other pump changes at this time\par -Continue to watch diet, pt remains on Optavia diet\par -Increase exercise as tolerated, goal of 30 mins a day 5x a week\par -Continue to follow up with all specialists including ophtho, podiatry\par \par DEXA scan 2021- osteopenia, continue surveillance, weight bearing exercises, and vit d supplementation \par \par Hypothyroid\par -Continue LT4 125 mcg daily, Patient advised to take every day in the morning, on an empty stomach, and with no other medications. \par \par HLD\par -Continue statin, need updated fasting lipid panel with next labs\par \par Vit B Def\par -Decrease supplement to every third day, levels remain elevated\par \par Vit D Def\par -Continue current dose of daily supplement, levels WNL\par \par Thyroid nodule\par -Q6 month surveillance ultrasound rx given\par RTO 3 months, labs before next visit

## 2022-04-11 NOTE — PHYSICAL EXAM
[Alert] : alert [Well Nourished] : well nourished [No Acute Distress] : no acute distress [Normal Sclera/Conjunctiva] : normal sclera/conjunctiva [Normal Hearing] : hearing was normal [Thyroid Not Enlarged] : the thyroid was not enlarged [No Accessory Muscle Use] : no accessory muscle use [Clear to Auscultation] : lungs were clear to auscultation bilaterally [Normal S1, S2] : normal S1 and S2 [Normal Rate] : heart rate was normal [No Edema] : no peripheral edema [Not Tender] : non-tender [Soft] : abdomen soft [Normal Gait] : normal gait [No Rash] : no rash [No Tremors] : no tremors [Oriented x3] : oriented to person, place, and time [de-identified] : dry skin

## 2022-04-11 NOTE — HISTORY OF PRESENT ILLNESS
[FreeTextEntry1] : Now on optavia diet. Down 22 lbs in approx one year.\par \par T1DM\par Severity: well controlled \par Onset: was misdiagnosed as T2DM\par Duration: approx 28 years\par \par SMBG\par On Dexcom as of 2.2022\par \par HGA1C 6.9\par \par Current drug regimen\par Tandem-Control IQ- NEW AS OF 2022\par \par Eye exam: 5/2021, denies DR\par Foot exam: does not see podiatry- denies numbness/tingling in b.l feet \par Kidney disease: on ramipril for kidney protection \par Heart disease: denies but strong family history \par \par Weight: lost 22 lbs since being on optavia\par Diet:\par B: does not eat breakfast, tea with milk/equal \par L: nutrasystem meals , sandwhich/egg salad sandwhich on whole wheat \par D: meat, veggie, starch\par S: peanut butter \par Exercise: has not been exercising \par Smoking: former smoker \par \par Hashimotos\par Current regimen: Levothyroxine 125 mcg daily\par Patient advised to take every day in the morning, on an empty stomach, and with no other medications. \par Current TFTs: TSH 1.830, Free T4 1.57 , Free T3 2.4\par \par HLD\par No updated lipid panel\par Atorvastatin 20 mg day\par \par Vit B Def\par On daily B12 1000 mcg every other day\par On labs 1557- slightly high\par \par Vit D Def\par On labs 66.0\par On 2000 IU daily\par \par +Osteopenia\par \par Thyroid nodule\par -Diffusely heterogenous thyroid gland\par -Hypoechoic ovoid nodule posterior to the inferior aspect of the left thyroid lobe measuring 1.0 x 0.4 x 3.5 cm. Differential includes parathyroid adenoma.

## 2022-04-12 ENCOUNTER — OUTPATIENT (OUTPATIENT)
Dept: OUTPATIENT SERVICES | Facility: HOSPITAL | Age: 57
LOS: 1 days | End: 2022-04-12
Payer: COMMERCIAL

## 2022-04-12 ENCOUNTER — TRANSCRIPTION ENCOUNTER (OUTPATIENT)
Age: 57
End: 2022-04-12

## 2022-04-12 ENCOUNTER — OUTPATIENT (OUTPATIENT)
Dept: OUTPATIENT SERVICES | Facility: HOSPITAL | Age: 57
LOS: 1 days | End: 2022-04-12

## 2022-04-12 ENCOUNTER — RESULT REVIEW (OUTPATIENT)
Age: 57
End: 2022-04-12

## 2022-04-12 ENCOUNTER — APPOINTMENT (OUTPATIENT)
Dept: MAMMOGRAPHY | Facility: CLINIC | Age: 57
End: 2022-04-12
Payer: COMMERCIAL

## 2022-04-12 ENCOUNTER — APPOINTMENT (OUTPATIENT)
Dept: ULTRASOUND IMAGING | Facility: CLINIC | Age: 57
End: 2022-04-12
Payer: COMMERCIAL

## 2022-04-12 DIAGNOSIS — Z00.00 ENCOUNTER FOR GENERAL ADULT MEDICAL EXAMINATION WITHOUT ABNORMAL FINDINGS: ICD-10-CM

## 2022-04-12 DIAGNOSIS — Z00.8 ENCOUNTER FOR OTHER GENERAL EXAMINATION: ICD-10-CM

## 2022-04-12 PROCEDURE — 77063 BREAST TOMOSYNTHESIS BI: CPT | Mod: 26

## 2022-04-12 PROCEDURE — 76536 US EXAM OF HEAD AND NECK: CPT | Mod: 26

## 2022-04-12 PROCEDURE — 77067 SCR MAMMO BI INCL CAD: CPT

## 2022-04-12 PROCEDURE — 77067 SCR MAMMO BI INCL CAD: CPT | Mod: 26

## 2022-04-12 PROCEDURE — 77063 BREAST TOMOSYNTHESIS BI: CPT

## 2022-04-12 PROCEDURE — 76536 US EXAM OF HEAD AND NECK: CPT

## 2022-04-18 ENCOUNTER — TRANSCRIPTION ENCOUNTER (OUTPATIENT)
Age: 57
End: 2022-04-18

## 2022-04-25 ENCOUNTER — RX RENEWAL (OUTPATIENT)
Age: 57
End: 2022-04-25

## 2022-05-13 ENCOUNTER — APPOINTMENT (OUTPATIENT)
Dept: GASTROENTEROLOGY | Facility: CLINIC | Age: 57
End: 2022-05-13

## 2022-06-21 ENCOUNTER — APPOINTMENT (OUTPATIENT)
Dept: ENDOCRINOLOGY | Facility: CLINIC | Age: 57
End: 2022-06-21
Payer: COMMERCIAL

## 2022-06-21 VITALS
WEIGHT: 160 LBS | TEMPERATURE: 96.8 F | SYSTOLIC BLOOD PRESSURE: 100 MMHG | HEIGHT: 64 IN | BODY MASS INDEX: 27.31 KG/M2 | DIASTOLIC BLOOD PRESSURE: 60 MMHG

## 2022-06-21 DIAGNOSIS — E10.65 TYPE 1 DIABETES MELLITUS WITH HYPERGLYCEMIA: ICD-10-CM

## 2022-06-21 DIAGNOSIS — E53.8 DEFICIENCY OF OTHER SPECIFIED B GROUP VITAMINS: ICD-10-CM

## 2022-06-21 DIAGNOSIS — E78.00 PURE HYPERCHOLESTEROLEMIA, UNSPECIFIED: ICD-10-CM

## 2022-06-21 DIAGNOSIS — E06.3 AUTOIMMUNE THYROIDITIS: ICD-10-CM

## 2022-06-21 DIAGNOSIS — E03.9 HYPOTHYROIDISM, UNSPECIFIED: ICD-10-CM

## 2022-06-21 DIAGNOSIS — E55.9 VITAMIN D DEFICIENCY, UNSPECIFIED: ICD-10-CM

## 2022-06-21 LAB
HBA1C MFR BLD HPLC: 6.9
HBA1C MFR BLD HPLC: 7.3
LDLC SERPL DIRECT ASSAY-MCNC: 98
MICROALBUMIN/CREAT 24H UR-RTO: 5
MICROALBUMIN/CREAT 24H UR-RTO: 6

## 2022-06-21 PROCEDURE — 99214 OFFICE O/P EST MOD 30 MIN: CPT | Mod: 25

## 2022-06-21 PROCEDURE — 95251 CONT GLUC MNTR ANALYSIS I&R: CPT

## 2022-06-21 RX ORDER — ALBUTEROL SULFATE 2.5 MG/.5ML
2.5 SOLUTION RESPIRATORY (INHALATION)
Qty: 90 | Refills: 0 | Status: ACTIVE | COMMUNITY
Start: 2022-04-26

## 2022-07-05 ENCOUNTER — RX RENEWAL (OUTPATIENT)
Age: 57
End: 2022-07-05

## 2022-07-05 RX ORDER — BLOOD SUGAR DIAGNOSTIC
STRIP MISCELLANEOUS
Qty: 600 | Refills: 0 | Status: ACTIVE | COMMUNITY
Start: 2019-06-21 | End: 1900-01-01

## 2022-07-11 ENCOUNTER — RX RENEWAL (OUTPATIENT)
Age: 57
End: 2022-07-11

## 2022-07-21 ENCOUNTER — NON-APPOINTMENT (OUTPATIENT)
Age: 57
End: 2022-07-21

## 2022-07-21 ENCOUNTER — APPOINTMENT (OUTPATIENT)
Dept: OPHTHALMOLOGY | Facility: CLINIC | Age: 57
End: 2022-07-21

## 2022-07-21 PROCEDURE — 92004 COMPRE OPH EXAM NEW PT 1/>: CPT

## 2022-08-15 ENCOUNTER — RX RENEWAL (OUTPATIENT)
Age: 57
End: 2022-08-15

## 2022-09-21 ENCOUNTER — APPOINTMENT (OUTPATIENT)
Dept: ENDOCRINOLOGY | Facility: CLINIC | Age: 57
End: 2022-09-21

## 2022-09-30 NOTE — PHYSICAL EXAM
[Alert] : alert [Well Nourished] : well nourished [No Acute Distress] : no acute distress [Normal Sclera/Conjunctiva] : normal sclera/conjunctiva [Normal Hearing] : hearing was normal [Thyroid Not Enlarged] : the thyroid was not enlarged [No Accessory Muscle Use] : no accessory muscle use [Clear to Auscultation] : lungs were clear to auscultation bilaterally [Normal S1, S2] : normal S1 and S2 [Normal Rate] : heart rate was normal [No Edema] : no peripheral edema [Not Tender] : non-tender [Soft] : abdomen soft [Normal Gait] : normal gait [No Rash] : no rash [No Tremors] : no tremors [Oriented x3] : oriented to person, place, and time [de-identified] : dry skin

## 2022-09-30 NOTE — ASSESSMENT
[FreeTextEntry1] : T1DM\par -Pt doing well, HGA1C slightly worse but pt admits this is because she stopped optavia diet while house hunting. \par -Do not count 18 g of carb for broccoli as she goes low she does this, will input 10 g of carbs instead. \par -Continue to watch diet, pt plans to restart on Optavia diet\par -Increase exercise as tolerated, goal of 30 mins a day 5x a week\par -Continue to follow up with all specialists including ophtho, podiatry. Retinal exam apt made today at check out.\par \par DEXA scan 2021- osteopenia, continue surveillance, weight bearing exercises, and vit d supplementation \par \par Hypothyroid\par -Continue LT4 125 mcg daily, Patient advised to take every day in the morning, on an empty stomach, and with no other medications. Pt instructed to let us know if she has any s/s of hyperthyroidism\par \par HLD\par -Continue statin, need updated fasting lipid panel with next labs\par \par Vit B Def\par -Hold vit B supplementation, levels remain elevated\par \par Vit D Def\par -Continue current dose of daily supplement, levels WNL\par \par Thyroid nodule\par -Q6 month surveillance ultrasound needed for fall 2022\par \par RTO 3 months, labs will not be due until after next visit

## 2022-09-30 NOTE — HISTORY OF PRESENT ILLNESS
[FreeTextEntry1] : Off optavia diet, off for one month,maintaining. Does plan to resume soon.\par [place] \par \par T1DM\par Severity: well controlled \par Onset: was misdiagnosed as T2DM\par Duration: approx 28 years\par \par SMBG\par On Dexcom as of 2.2022\par \par Glucose Sensor Necessity:  This patient with diabetes performs 4 or more glucose checks per day utilizing a continous blood glucose monitor.  The patient is treated with insulin via a subcutaneous insulin infusion pump.  This patient requires frequent adjustments to their insulin treatment on the basis of therapeutic continuous glucose monitoring results.  In addition, the patient has been to our office for an evaluation of their diabetes control within the past 6 months.  \par  \par HGA1C 6/2022-7.3\par \par Current drug regimen\par Tandem-Control IQ- NEW AS OF 2022\par \par Eye exam: 5/2021, denies DR\par Foot exam: does not see podiatry- denies numbness/tingling in b.l feet \par Kidney disease: on ramipril for kidney protection \par Heart disease: denies but strong family history \par \par Weight: lost 22 lbs since being on optavia\par Diet:Optavia diet\par Exercise: has not been exercising \par Smoking: former smoker \par \par Hashimotos\par Current regimen: Levothyroxine 125 mcg daily\par Patient advised to take every day in the morning, on an empty stomach, and with no other medications. \par Current TFTs: TSH 0.418, Free T4 1.82\par \par HLD\par No updated lipid panel\par Atorvastatin 20 mg day\par \par Vit B Def\par On daily B12 1000 mcg every other day\par On labs 1250- slightly high \par \par Vit D Def\par On labs 66.0\par On 2000 IU daily\par \par +Osteopenia\par \par Thyroid nodule\par -Diffusely heterogenous thyroid gland\par -Hypoechoic ovoid nodule posterior to the inferior aspect of the left thyroid lobe measuring 1.0 x 0.4 x 3.5 cm. Differential includes parathyroid adenoma. \par -Stable 4/2022 sonogram

## 2022-10-11 ENCOUNTER — RX RENEWAL (OUTPATIENT)
Age: 57
End: 2022-10-11

## 2022-10-13 ENCOUNTER — NON-APPOINTMENT (OUTPATIENT)
Age: 57
End: 2022-10-13

## 2022-10-19 ENCOUNTER — APPOINTMENT (OUTPATIENT)
Dept: ENDOCRINOLOGY | Facility: CLINIC | Age: 57
End: 2022-10-19

## 2022-10-24 ENCOUNTER — RX RENEWAL (OUTPATIENT)
Age: 57
End: 2022-10-24

## 2022-11-04 NOTE — HISTORY OF PRESENT ILLNESS
Initial SW/CM Assessment/Plan of Care Note     Baseline Assessment  63year old admitted 11/1/2022 as Inpatient with a diagnosis of *left femur fracture. Prior to admission patient was living with Alone and residing at [facility]. Patientâs Primary Care Provider is Dimitri Benitez MD.     Medical History  Past Medical History:   Diagnosis Date   â¢ Atrial flutter (CMS/[facility])    â¢ Cerebral infarction (CMS/[facility])    â¢ CHF (congestive heart failure) (CMS/[facility])    â¢ CKD (chronic kidney disease)    â¢ Diabetes mellitus (CMS/[facility])    â¢ Essential (primary) hypertension    â¢ Hyperlipidemia    â¢ Melena    â¢ Pulmonary embolism (CMS/[facility])        Prior to Admission Status  Verified Living arrangments as above. Agency/Support  Type of Services Prior to Hospitalization: None  Support Systems: Children - daughter  Home Devices/Equipment: None  Mobility Assist Devices: None  Sensory Support Devices: None    Current Status  Current Mental Status: oriented x 4 , slow to respond  Stressors: denies    Insurance  Primary: [employer]  Secondary: N/A    Barriers to Discharge  Identified Clinical Discharge Barriers to Discharge/Transition Planning: clinical improvement and surgery    Progress Note  Met with patient to assess for needs at discharge. Introduced and explained role of CM/Discharge planner. Provided patient with CM/SW contact info and received. Verified patient's current address and contact info. Patient verbalized understanding that Care Management team  will continue to follow and reevaluate for discharge needs. States independent with ADLs prior to admission. States from home alone in apt/daughter assists with shopping and driving    Plan  SW/CM - Recommendations for Discharge: pending  Therapy-recommendations for Discharge: pending    Anticipate patient will need post-hospital services. Necessary services are available.   Anticipate patient cannot return to the environment from which patient entered the hospital.   Anticipate patient cannot provide self-care at discharge. Refer to /CM Flowsheet for Goals and objective data. [Menarche Age: ____] : age at menarche was [unfilled] [Sexually Active] : is sexually active

## 2022-11-11 ENCOUNTER — RX RENEWAL (OUTPATIENT)
Age: 57
End: 2022-11-11

## 2022-11-11 RX ORDER — RAMIPRIL 2.5 MG/1
2.5 CAPSULE ORAL
Qty: 90 | Refills: 1 | Status: ACTIVE | COMMUNITY
Start: 2022-08-15 | End: 1900-01-01

## 2023-01-19 ENCOUNTER — RX RENEWAL (OUTPATIENT)
Age: 58
End: 2023-01-19

## 2023-04-07 ENCOUNTER — RX RENEWAL (OUTPATIENT)
Age: 58
End: 2023-04-07

## 2023-07-13 ENCOUNTER — RX RENEWAL (OUTPATIENT)
Age: 58
End: 2023-07-13

## 2023-07-13 RX ORDER — INSULIN LISPRO 100 [IU]/ML
100 INJECTION, SOLUTION INTRAVENOUS; SUBCUTANEOUS
Qty: 90 | Refills: 1 | Status: ACTIVE | COMMUNITY
Start: 2022-04-25 | End: 1900-01-01

## 2023-07-27 ENCOUNTER — RX RENEWAL (OUTPATIENT)
Age: 58
End: 2023-07-27

## 2023-07-27 RX ORDER — LEVOTHYROXINE SODIUM 0.12 MG/1
125 TABLET ORAL
Qty: 90 | Refills: 1 | Status: ACTIVE | COMMUNITY
Start: 2021-10-20 | End: 1900-01-01